# Patient Record
Sex: FEMALE | Race: WHITE | HISPANIC OR LATINO | Employment: FULL TIME | URBAN - METROPOLITAN AREA
[De-identification: names, ages, dates, MRNs, and addresses within clinical notes are randomized per-mention and may not be internally consistent; named-entity substitution may affect disease eponyms.]

---

## 2017-01-24 ENCOUNTER — ALLSCRIPTS OFFICE VISIT (OUTPATIENT)
Dept: OTHER | Facility: OTHER | Age: 52
End: 2017-01-24

## 2017-01-24 ENCOUNTER — APPOINTMENT (OUTPATIENT)
Dept: LAB | Facility: CLINIC | Age: 52
End: 2017-01-24
Payer: COMMERCIAL

## 2017-01-24 ENCOUNTER — TRANSCRIBE ORDERS (OUTPATIENT)
Dept: LAB | Facility: CLINIC | Age: 52
End: 2017-01-24

## 2017-01-24 ENCOUNTER — GENERIC CONVERSION - ENCOUNTER (OUTPATIENT)
Dept: OTHER | Facility: OTHER | Age: 52
End: 2017-01-24

## 2017-01-24 DIAGNOSIS — R92.8 OTHER ABNORMAL AND INCONCLUSIVE FINDINGS ON DIAGNOSTIC IMAGING OF BREAST: ICD-10-CM

## 2017-01-24 DIAGNOSIS — R22.2 LOCALIZED SWELLING, MASS AND LUMP, TRUNK: ICD-10-CM

## 2017-01-24 DIAGNOSIS — D17.9 BENIGN LIPOMATOUS NEOPLASM: ICD-10-CM

## 2017-01-24 DIAGNOSIS — G47.33 OBSTRUCTIVE SLEEP APNEA (ADULT) (PEDIATRIC): ICD-10-CM

## 2017-01-24 DIAGNOSIS — Z12.31 ENCOUNTER FOR SCREENING MAMMOGRAM FOR MALIGNANT NEOPLASM OF BREAST: ICD-10-CM

## 2017-01-24 DIAGNOSIS — J30.9 ALLERGIC RHINITIS, CAUSE UNSPECIFIED: Primary | ICD-10-CM

## 2017-01-24 LAB
BASOPHILS # BLD AUTO: 0 %
BASOPHILS # BLD AUTO: 0 X10E3/UL (ref 0–0.2)
DEPRECATED RDW RBC AUTO: 14.2 % (ref 12.3–15.4)
EOSINOPHIL # BLD AUTO: 0.1 X10E3/UL (ref 0–0.4)
EOSINOPHIL # BLD AUTO: 2 %
HCT VFR BLD AUTO: 36.3 % (ref 34–46.6)
HGB BLD-MCNC: 12.4 G/DL (ref 11.1–15.9)
IMM.GRANULOCYTES (CD4/8) (HISTORICAL): 0 %
IMM.GRANULOCYTES (CD4/8) (HISTORICAL): 0 X10E3/UL (ref 0–0.1)
LYMPHOCYTES # BLD AUTO: 2.6 X10E3/UL (ref 0.7–3.1)
LYMPHOCYTES # BLD AUTO: 35 %
MCH RBC QN AUTO: 29.9 PG (ref 26.6–33)
MCHC RBC AUTO-ENTMCNC: 34.2 G/DL (ref 31.5–35.7)
MCV RBC AUTO: 88 FL (ref 79–97)
MONOCYTES # BLD AUTO: 0.5 X10E3/UL (ref 0.1–0.9)
MONOCYTES (HISTORICAL): 6 %
NEUTROPHILS # BLD AUTO: 4.1 X10E3/UL (ref 1.4–7)
NEUTROPHILS # BLD AUTO: 57 %
PLATELET # BLD AUTO: 298 X10E3/UL (ref 150–379)
RBC (HISTORICAL): 4.15 X10E6/UL (ref 3.77–5.28)
VENIPUNCTURE: NORMAL
WBC # BLD AUTO: 7.3 X10E3/UL (ref 3.4–10.8)

## 2017-01-24 PROCEDURE — 36415 COLL VENOUS BLD VENIPUNCTURE: CPT | Performed by: FAMILY MEDICINE

## 2017-01-25 LAB
A/G RATIO (HISTORICAL): 1.4 (ref 1.1–2.5)
ALBUMIN SERPL BCP-MCNC: 4.1 G/DL (ref 3.5–5.5)
ALP SERPL-CCNC: 92 IU/L (ref 39–117)
ALT SERPL W P-5'-P-CCNC: 12 IU/L (ref 0–32)
AST SERPL W P-5'-P-CCNC: 15 IU/L (ref 0–40)
BILIRUB SERPL-MCNC: 0.3 MG/DL (ref 0–1.2)
BILIRUB UR QL STRIP: NEGATIVE
BUN SERPL-MCNC: 16 MG/DL (ref 6–24)
BUN/CREA RATIO (HISTORICAL): 22 (ref 9–23)
CALCIUM SERPL-MCNC: 9.3 MG/DL (ref 8.7–10.2)
CHLORIDE SERPL-SCNC: 104 MMOL/L (ref 96–106)
CHOLEST SERPL-MCNC: 251 MG/DL (ref 100–199)
CO2 SERPL-SCNC: 27 MMOL/L (ref 18–29)
COLOR UR: YELLOW
COMMENT (HISTORICAL): CLEAR
CREAT SERPL-MCNC: 0.74 MG/DL (ref 0.57–1)
EGFR AFRICAN AMERICAN (HISTORICAL): 109 ML/MIN/1.73
EGFR-AMERICAN CALC (HISTORICAL): 95 ML/MIN/1.73
FECAL OCCULT BLOOD DIAGNOSTIC (HISTORICAL): NEGATIVE
GLUCOSE (HISTORICAL): NEGATIVE
GLUCOSE SERPL-MCNC: 88 MG/DL (ref 65–99)
HDLC SERPL-MCNC: 92 MG/DL
INTERPRETATION (HISTORICAL): NORMAL
KETONES UR STRIP-MCNC: NEGATIVE MG/DL
LDLC SERPL CALC-MCNC: 148 MG/DL (ref 0–99)
LEUKOCYTE ESTERASE UR QL STRIP: NEGATIVE
MICROSCOPIC EXAMINATION (HISTORICAL): NORMAL
NITRITE UR QL STRIP: NEGATIVE
PH UR STRIP.AUTO: 5.5 [PH] (ref 5–7.5)
POTASSIUM SERPL-SCNC: 4.8 MMOL/L (ref 3.5–5.2)
PROT UR STRIP-MCNC: NEGATIVE MG/DL
SODIUM SERPL-SCNC: 144 MMOL/L (ref 134–144)
SP GR UR STRIP.AUTO: 1.02 (ref 1–1.03)
TOT. GLOBULIN, SERUM (HISTORICAL): 2.9 G/DL (ref 1.5–4.5)
TOTAL PROTEIN (HISTORICAL): 7 G/DL (ref 6–8.5)
TRIGL SERPL-MCNC: 56 MG/DL (ref 0–149)
TSH SERPL DL<=0.05 MIU/L-ACNC: 1.56 UIU/ML (ref 0.45–4.5)
UROBILINOGEN UR QL STRIP.AUTO: 0.2 EU/DL (ref 0.2–1)
VLDLC SERPL CALC-MCNC: 11 MG/DL (ref 5–40)

## 2017-01-26 ENCOUNTER — GENERIC CONVERSION - ENCOUNTER (OUTPATIENT)
Dept: OTHER | Facility: OTHER | Age: 52
End: 2017-01-26

## 2017-01-31 ENCOUNTER — HOSPITAL ENCOUNTER (OUTPATIENT)
Dept: RADIOLOGY | Facility: CLINIC | Age: 52
Discharge: HOME | End: 2017-01-31
Payer: COMMERCIAL

## 2017-01-31 DIAGNOSIS — D17.9 BENIGN LIPOMATOUS NEOPLASM: ICD-10-CM

## 2017-01-31 PROCEDURE — 76705 ECHO EXAM OF ABDOMEN: CPT

## 2017-02-01 ENCOUNTER — GENERIC CONVERSION - ENCOUNTER (OUTPATIENT)
Dept: OTHER | Facility: OTHER | Age: 52
End: 2017-02-01

## 2017-02-07 ENCOUNTER — ALLSCRIPTS OFFICE VISIT (OUTPATIENT)
Dept: OTHER | Facility: OTHER | Age: 52
End: 2017-02-07

## 2017-02-07 ENCOUNTER — GENERIC CONVERSION - ENCOUNTER (OUTPATIENT)
Dept: OTHER | Facility: OTHER | Age: 52
End: 2017-02-07

## 2017-02-07 ENCOUNTER — HOSPITAL ENCOUNTER (OUTPATIENT)
Dept: RADIOLOGY | Facility: HOSPITAL | Age: 52
Discharge: HOME | End: 2017-02-07
Attending: FAMILY MEDICINE
Payer: COMMERCIAL

## 2017-02-07 DIAGNOSIS — Z12.31 ENCOUNTER FOR SCREENING MAMMOGRAM FOR MALIGNANT NEOPLASM OF BREAST: ICD-10-CM

## 2017-02-07 PROCEDURE — G0202 SCR MAMMO BI INCL CAD: HCPCS

## 2017-02-17 ENCOUNTER — HOSPITAL ENCOUNTER (OUTPATIENT)
Dept: RADIOLOGY | Facility: HOSPITAL | Age: 52
Discharge: HOME | End: 2017-02-17
Attending: FAMILY MEDICINE
Payer: COMMERCIAL

## 2017-02-17 DIAGNOSIS — R92.8 OTHER ABNORMAL AND INCONCLUSIVE FINDINGS ON DIAGNOSTIC IMAGING OF BREAST: ICD-10-CM

## 2017-02-17 PROCEDURE — 76642 ULTRASOUND BREAST LIMITED: CPT

## 2017-02-20 ENCOUNTER — GENERIC CONVERSION - ENCOUNTER (OUTPATIENT)
Dept: OTHER | Facility: OTHER | Age: 52
End: 2017-02-20

## 2017-02-20 ENCOUNTER — ALLSCRIPTS OFFICE VISIT (OUTPATIENT)
Dept: OTHER | Facility: OTHER | Age: 52
End: 2017-02-20

## 2017-03-02 ENCOUNTER — ALLSCRIPTS OFFICE VISIT (OUTPATIENT)
Dept: OTHER | Facility: OTHER | Age: 52
End: 2017-03-02

## 2017-03-16 RX ORDER — FLUTICASONE PROPIONATE 50 MCG
1 SPRAY, SUSPENSION (ML) NASAL AS NEEDED
COMMUNITY
End: 2018-03-29

## 2017-03-19 ENCOUNTER — ANESTHESIA EVENT (OUTPATIENT)
Dept: GASTROENTEROLOGY | Facility: AMBULARY SURGERY CENTER | Age: 52
End: 2017-03-19
Payer: COMMERCIAL

## 2017-03-20 ENCOUNTER — GENERIC CONVERSION - ENCOUNTER (OUTPATIENT)
Dept: OTHER | Facility: OTHER | Age: 52
End: 2017-03-20

## 2017-03-20 ENCOUNTER — HOSPITAL ENCOUNTER (OUTPATIENT)
Facility: AMBULARY SURGERY CENTER | Age: 52
Setting detail: OUTPATIENT SURGERY
Discharge: HOME | End: 2017-03-20
Attending: INTERNAL MEDICINE | Admitting: INTERNAL MEDICINE
Payer: COMMERCIAL

## 2017-03-20 ENCOUNTER — ANESTHESIA (OUTPATIENT)
Dept: GASTROENTEROLOGY | Facility: AMBULARY SURGERY CENTER | Age: 52
End: 2017-03-20
Payer: COMMERCIAL

## 2017-03-20 VITALS
WEIGHT: 167 LBS | SYSTOLIC BLOOD PRESSURE: 112 MMHG | HEART RATE: 57 BPM | DIASTOLIC BLOOD PRESSURE: 67 MMHG | HEIGHT: 66 IN | OXYGEN SATURATION: 99 % | TEMPERATURE: 97.6 F | RESPIRATION RATE: 20 BRPM | BODY MASS INDEX: 26.84 KG/M2

## 2017-03-20 RX ORDER — SODIUM CHLORIDE, SODIUM LACTATE, POTASSIUM CHLORIDE, CALCIUM CHLORIDE 600; 310; 30; 20 MG/100ML; MG/100ML; MG/100ML; MG/100ML
125 INJECTION, SOLUTION INTRAVENOUS CONTINUOUS
Status: DISCONTINUED | OUTPATIENT
Start: 2017-03-20 | End: 2017-03-20 | Stop reason: HOSPADM

## 2017-03-20 RX ORDER — PROPOFOL 10 MG/ML
INJECTION, EMULSION INTRAVENOUS AS NEEDED
Status: DISCONTINUED | OUTPATIENT
Start: 2017-03-20 | End: 2017-03-20 | Stop reason: SURG

## 2017-03-20 RX ADMIN — PROPOFOL 20 MG: 10 INJECTION, EMULSION INTRAVENOUS at 11:46

## 2017-03-20 RX ADMIN — PROPOFOL 20 MG: 10 INJECTION, EMULSION INTRAVENOUS at 11:44

## 2017-03-20 RX ADMIN — PROPOFOL 10 MG: 10 INJECTION, EMULSION INTRAVENOUS at 11:51

## 2017-03-20 RX ADMIN — PROPOFOL 30 MG: 10 INJECTION, EMULSION INTRAVENOUS at 11:43

## 2017-03-20 RX ADMIN — LIDOCAINE HYDROCHLORIDE 50 MG: 20 INJECTION, SOLUTION INTRAVENOUS at 11:42

## 2017-03-20 RX ADMIN — PROPOFOL 100 MG: 10 INJECTION, EMULSION INTRAVENOUS at 11:42

## 2017-03-20 RX ADMIN — PROPOFOL 20 MG: 10 INJECTION, EMULSION INTRAVENOUS at 11:49

## 2017-03-20 RX ADMIN — SODIUM CHLORIDE, SODIUM LACTATE, POTASSIUM CHLORIDE, AND CALCIUM CHLORIDE: .6; .31; .03; .02 INJECTION, SOLUTION INTRAVENOUS at 11:37

## 2017-05-02 DIAGNOSIS — R92.8 OTHER ABNORMAL AND INCONCLUSIVE FINDINGS ON DIAGNOSTIC IMAGING OF BREAST: ICD-10-CM

## 2018-01-09 NOTE — RESULT NOTES
Message   please call patient  u/s look benign and most likely scarring from prior surgery  will repeat u/s in 3 months   Verified Results  *US BREAST LEFT LIMITED (DIAGNOSTIC) [id number] 02:05PM My Dempsey Order Number: [id number]    - Patient Instructions: To schedule this appointment, please contact Central Scheduling at [phone]  Test Name Result Flag Reference   US BREAST LEFT LIMITED (Report)     Patient History:   Family history of breast cancer at age 61 in sister  Reductions of both breasts, January 1, 2016  Patient's BMI is 27 4  Further assessment of bilateral mammographic findings on most    recent screening study dated 2/7/2017  Of note, this most recent   screening examination was the 1st postoperative exam after    reduction mammoplasty which was reportedly performed April 2016  US Breast Right Limited: February 17, 2017 - Check In #: [de-identified]   Standard views  Technologist: HELEN Barber RDMS RVT RDCS   Ultrasound of the right breast at the 5 o'clock position 4 cm    from nipple demonstrates a small oval-shaped very minimally    lobulated hypoechoic island of tissue which is 9 x 7 x 4 mm and    appears to be a good match for the mammographic lesion  Doppler    does not demonstrate any internal blood flow  There is a small    blood vessel near the margin  This does not produce shadowing    deep to the lesion and the margins are smooth  Another much more   superficial similar finding is seen at the 5 o'clock position 6    cm from nipple measuring 5 x 4 x 2 mm  In the left breast there were no findings inferiorly  At the 9    o'clock position there is an [place] of slightly heterogeneous but   hypoechoic tissue 9:00 location 4 cm from nipple which is 11 x    13 x 10 mm  This corresponds to one of the mammographic    findings  There is some shadowing deep to this area  It has    more echogenic appearance more superficially   Doppler does not demonstrate internal or peripheral flow  At the 11:00 location 1 cm from nipple there is a similar    appearing island of hypoechoic tissue which is 10 x 11 x 14 mm  It is somewhat ill-defined and produces some shadowing but is    nonvascular  I suspect that the findings in both breasts are probably    postoperative scarring given the prior reduction mammoplasty  However, as the ultrasound findings are nonspecific, I would    suggest a 3 month follow-up bilateral ultrasound examination of    these findings to ensure stability or decrease in size  If there   are any signs of growth or other concerning change at the next    ultrasound follow-up, one or more of these findings might require   ultrasound-guided core biopsy for definitive evaluation  I    explained this to the patient  Indeterminate hypoechoic islands of tissue bilaterally   which I suspect probably represents scarring given the history    of reduction mammoplasty since the previous screening exam     Short interval follow-up recommended  US Breast Left Limited: February 17, 2017 - Check In #: [de-identified]   Standard views  Technologist: HELEN Mills RDMS RVT RDCS     ACR BI-RADSï¾® Assessments: BiRad:3 - Probably Benign (Overall)   Right breast Right Brst US: BiRad:3 - probably benign finding in    the right breast    Left breast Left Brst US: BiRad:3 - probably benign finding in    the left breast      Recommendation:   Ultrasound of both breasts in 3 months  I explained the findings and recommendations to the patient in    person  I also encouraged monthly self breast examination to    assess for any developing or enlarging masses  The patient    states that she currently does not feel any lumps       Transcription Location:  M[facility] 98: [id number]     Risk Value(s):   Tyrer-Cuzick 10 Year: 4 100%, Tyrer-Cuzick Lifetime: 17 000%,    Myriad Table: 1 5%, LAURENCE 5 Year: 1 9%, NCI Lifetime: 16 4%   Signed by: Kathya Diamond MD   2/17/17

## 2018-01-11 NOTE — RESULT NOTES
Verified Results  (1) CBC/PLT/DIFF [id number] 12:00AM [facility]     Test Name Result Flag Reference   WBC 7 3 x10E3/uL  3 4-10 8   RBC 4 15 x10E6/uL  3 77-5 28   Hemoglobin 12 4 g/dL  11 1-15 9   Hematocrit 36 3 %  34 0-46  6   MCV 88 fL  79-97   MCH 29 9 pg  26 6-33 0   MCHC 34 2 g/dL  31 5-35 7   RDW 14 2 %  12 3-15 4   Platelets 469 R35E5/LV  150-379   Neutrophils 57 %     Lymphs 35 %     Monocytes 6 %     Eos 2 %     Basos 0 %     Neutrophils (Absolute) 4 1 x10E3/uL  1 4-7 0   Lymphs (Absolute) 2 6 x10E3/uL  0 7-3 1   Monocytes(Absolute) 0 5 x10E3/uL  0 1-0 9   Eos (Absolute) 0 1 x10E3/uL  0 0-0 4   Baso (Absolute) 0 0 x10E3/uL  0 0-0 2   Immature Granulocytes 0 %     Immature Grans (Abs) 0 0 x10E3/uL  0 0-0 1     (1) COMPREHENSIVE METABOLIC PANEL [id number] 52:04GU [facility]     Test Name Result Flag Reference   Glucose, Serum 88 mg/dL  65-99   BUN 16 mg/dL  6-24   Creatinine, Serum 0 74 mg/dL  0 57-1 00   eGFR If NonAfricn Am 95 mL/min/1 73  >59   eGFR If Africn Am 109 mL/min/1 73  >59   BUN/Creatinine Ratio 22  9-23   Sodium, Serum 144 mmol/L  134-144   Potassium, Serum 4 8 mmol/L  3 5-5 2   Chloride, Serum 104 mmol/L     Carbon Dioxide, Total 27 mmol/L  18-29   Calcium, Serum 9 3 mg/dL  8 7-10 2   Protein, Total, Serum 7 0 g/dL  6 0-8 5   Albumin, Serum 4 1 g/dL  3 5-5 5   Globulin, Total 2 9 g/dL  1 5-4 5   A/G Ratio 1 4  1 1-2 5   Bilirubin, Total 0 3 mg/dL  0 0-1 2   Alkaline Phosphatase, S 92 IU/L     AST (SGOT) 15 IU/L  0-40   ALT (SGPT) 12 IU/L  0-32     (1) TSH [id number] 12:00AM [facility]     Test Name Result Flag Reference   TSH 1 560 uIU/mL  0 450-4 500     (1) URINALYSIS (will reflex a microscopy if leukocytes, occult blood, protein or nitrites are not within normal limits) [id number] 12:00AM [facility]     Test Name Result Flag Reference   Specific Gravity 1 019  1 005-1 030   pH 5 5  5 0-7 5   Urine-Color Yellow  Yellow   Appearance Clear  Clear   WBC Esterase Negative  Negative   Protein Negative  Negative/Trace   Glucose Negative  Negative   Ketones Negative  Negative   Occult Blood Negative  Negative   Bilirubin Negative  Negative   Urobilinogen,Semi-Qn 0 2 EU/dL  0 2-1 0   Nitrite, Urine Negative  Negative   Microscopic Examination Comment     Microscopic follows if indicated  [facility]) LP [id number] 12:00AM [facility]     Test Name Result Flag Reference   Cholesterol, Total 251 mg/dL H 100-199   Triglycerides 56 mg/dL  0-149   HDL Cholesterol 92 mg/dL  >39   VLDL Cholesterol Tommie 11 mg/dL  5-40   LDL Cholesterol Calc 148 mg/dL H 0-99     [facility]) Cardiovascular Risk Assessment [id number] 12:00AM [employer]     Test Name Result Flag Reference   Interpretation Note     Supplement report is available  PDF Image   Discussion/Summary   Cholesterol is elevated  Please make sure to eat a heart healthy diet with low white carbs and low refined sugar  Your labs are stable  Please follow up as we discussed at your last appointment    Dr Tresa Cueto

## 2018-01-12 VITALS
TEMPERATURE: 97.6 F | HEART RATE: 70 BPM | SYSTOLIC BLOOD PRESSURE: 116 MMHG | WEIGHT: 171 LBS | BODY MASS INDEX: 27.48 KG/M2 | DIASTOLIC BLOOD PRESSURE: 70 MMHG | HEIGHT: 66 IN | RESPIRATION RATE: 16 BRPM

## 2018-01-13 VITALS
HEART RATE: 61 BPM | SYSTOLIC BLOOD PRESSURE: 120 MMHG | WEIGHT: 171.13 LBS | OXYGEN SATURATION: 98 % | TEMPERATURE: 97.6 F | BODY MASS INDEX: 27.5 KG/M2 | HEIGHT: 66 IN | RESPIRATION RATE: 16 BRPM | DIASTOLIC BLOOD PRESSURE: 76 MMHG

## 2018-01-13 VITALS
HEART RATE: 62 BPM | DIASTOLIC BLOOD PRESSURE: 58 MMHG | HEIGHT: 66 IN | SYSTOLIC BLOOD PRESSURE: 110 MMHG | OXYGEN SATURATION: 99 % | WEIGHT: 169.13 LBS | TEMPERATURE: 98 F | BODY MASS INDEX: 27.18 KG/M2

## 2018-01-13 VITALS
RESPIRATION RATE: 16 BRPM | DIASTOLIC BLOOD PRESSURE: 66 MMHG | BODY MASS INDEX: 27.32 KG/M2 | TEMPERATURE: 97.9 F | SYSTOLIC BLOOD PRESSURE: 110 MMHG | HEIGHT: 66 IN | HEART RATE: 60 BPM | WEIGHT: 170 LBS

## 2018-01-13 NOTE — RESULT NOTES
Message   please call patient  + cyst or abscess and radiology recommends a biopsy  neither are concerning  please give the number for Dr Russ Ramirez for bx   rl      Verified Results  Baron 38 (NON EXTREMITY) [id number] 08:46AM I[facility] To Order Number: [id number]    - Patient Instructions: To schedule this appointment, please contact Central Scheduling at [phone]  Test Name Result Flag Reference   US TRUNK SOFT TISSUE (Report)     ULTRASOUND posterior right flank     TECHNIQUE:  Using a high frequency transducer, the right posterior flank was scanned to evaluate a palpable lump  INDICATION: Palpable lump  COMPARISON: None     FINDINGS:     Within the musculature of the posterior right flank is a fairly well-circumscribed hypoechoic mass which measures 1 4 cm in diameter  This has internal echoes with no enhanced through transmission  This appears avascular  IMPRESSION:     Hypoechoic mass in the posterior right flank as described  This appears avascular  This could represent a sebaceous cyst or abscess  Biopsy suggested       ##sigslh##sigslh       Workstation performed: [id number]     Signed by:   Logan Mcmanus MD   1/31/17

## 2018-03-13 ENCOUNTER — TRANSCRIBE ORDERS (OUTPATIENT)
Dept: URGENT CARE | Facility: CLINIC | Age: 53
End: 2018-03-13

## 2018-03-13 ENCOUNTER — APPOINTMENT (OUTPATIENT)
Dept: RADIOLOGY | Facility: CLINIC | Age: 53
End: 2018-03-13

## 2018-03-13 DIAGNOSIS — Z02.1 PRE-EMPLOYMENT EXAMINATION: ICD-10-CM

## 2018-03-13 DIAGNOSIS — Z02.1 PRE-EMPLOYMENT EXAMINATION: Primary | ICD-10-CM

## 2018-03-13 PROCEDURE — 71045 X-RAY EXAM CHEST 1 VIEW: CPT

## 2018-03-29 ENCOUNTER — OFFICE VISIT (OUTPATIENT)
Dept: FAMILY MEDICINE CLINIC | Facility: CLINIC | Age: 53
End: 2018-03-29
Payer: COMMERCIAL

## 2018-03-29 VITALS
SYSTOLIC BLOOD PRESSURE: 122 MMHG | DIASTOLIC BLOOD PRESSURE: 82 MMHG | WEIGHT: 166.4 LBS | HEIGHT: 66 IN | BODY MASS INDEX: 26.74 KG/M2 | TEMPERATURE: 97.4 F | RESPIRATION RATE: 16 BRPM | HEART RATE: 62 BPM

## 2018-03-29 DIAGNOSIS — R53.83 FATIGUE, UNSPECIFIED TYPE: ICD-10-CM

## 2018-03-29 DIAGNOSIS — R92.8 ABNORMAL MAMMOGRAM: ICD-10-CM

## 2018-03-29 DIAGNOSIS — Z98.890 S/P GASTRIC SURGERY: ICD-10-CM

## 2018-03-29 DIAGNOSIS — Z00.00 PHYSICAL EXAM, ANNUAL: Primary | ICD-10-CM

## 2018-03-29 DIAGNOSIS — E78.2 HYPERLIPEMIA, MIXED: ICD-10-CM

## 2018-03-29 PROBLEM — G47.33 OSA (OBSTRUCTIVE SLEEP APNEA): Status: ACTIVE | Noted: 2017-01-24

## 2018-03-29 PROBLEM — K59.00 CONSTIPATION: Status: ACTIVE | Noted: 2017-02-07

## 2018-03-29 PROBLEM — D17.9 LIPOMA: Status: ACTIVE | Noted: 2017-01-24

## 2018-03-29 PROCEDURE — 99396 PREV VISIT EST AGE 40-64: CPT | Performed by: FAMILY MEDICINE

## 2018-03-29 NOTE — PATIENT INSTRUCTIONS
Reviewed with patient the appropriate health maintenance testing  needed  Discussed the importance of diet and exercise and the role it plays in prevention of disease  Discussed with patient the importance of emotional health as well as physical health  Immunizations reviewed and discussed          Get bw done  Get mammogram and u/s done  Pap due next year     F/u in 1 m

## 2018-03-29 NOTE — PROGRESS NOTES
FAMILY PRACTICE HEALTH MAINTENANCE OFFICE VISIT  [facility]    NAME: Sharyn Vizcaino  AGE: 47 y o  SEX: female  : 1965     DATE: 3/29/2018    Assessment and Plan     Problem List Items Addressed This Visit     Abnormal mammogram     Overdue for u/s of b/l breasts  Will give diag and u/s orders         Relevant Orders    Mammo diagnostic bilateral w cad    US breast left limited (diagnostic)    US breast right limited (diagnostic)    Hyperlipemia, mixed     Will repeat bw          Relevant Orders    Lipid panel      Other Visit Diagnoses     Physical exam, annual    -  Primary    hm discussed    BMI 26 0-26 9,adult        Fatigue, unspecified type        hx of gastric sleeve  will get bw including vit levels and iron     Relevant Orders    CBC and differential    Comprehensive metabolic panel    TSH, 3rd generation with T4 reflex    Urinalysis with microscopic    S/P gastric surgery        Relevant Orders    Vitamin B12    Vitamin D 25 hydroxy    Iron    TIBC            · Patient Counseling:   · Nutrition: Stressed importance of a well balanced diet, moderation of sodium/saturated fat, caloric balance and sufficient intake of fiber  · Exercise: Stressed the importance of regular exercise with a goal of 150 minutes per week    · Immunizations reviewed  Refused tdap  · Discussed benefits of screening   · Discussed the patient's BMI with her  The BMI is above average; BMI management plan is completed     Return in about 4 weeks (around 2018)  Chief Complaint     Chief Complaint   Patient presents with    Physical Exam       History of Present Illness     Pt seen and examined  Here for physical  Pt was supposed to repeat u/s of b/l breasts 3 months from 2017-didn't get it done  Declined tdap  Reviewed bw from 2017 and cholesterol was elevated    Pt hasn't had pap since 2016    Had breast reduction and abdominoplasy in         Pt stated that she doesn't feel well  Feels dizzy and occasional headache  Pt's  stated that she doesn't snore    Had PEDRITO but improved after gastric sleeve    Does have hx of anemia  C/o feeling cold         Well Adult Physical   Patient here for a comprehensive physical exam       Diet and Physical Activity  Diet: doesn't eat a lot of meat and had gastric sleeve  Weight concerns: feels gaining weight  Exercise: not currently due to weather       Depression Screen  PHQ-9 Depression Screening    PHQ-9:    Frequency of the following problems over the past two weeks:       Little interest or pleasure in doing things:  0 - not at all  Feeling down, depressed, or hopeless:  0 - not at all  PHQ-2 Score:  0          General Health  Hearing: no issues   Vision: wears glasses  Dental: regular dental visits    Reproductive Health        The following portions of the patient's history were reviewed and updated as appropriate: allergies, current medications, past family history, past medical history, past social history, past surgical history and problem list     Review of Systems     Review of Systems   Constitutional: Positive for fatigue  Negative for activity change and appetite change  Weight gain   HENT: Negative for hearing loss  Eyes: Negative for visual disturbance  Respiratory: Negative for cough, shortness of breath and wheezing  Cardiovascular: Negative for chest pain, palpitations and leg swelling  Gastrointestinal: Positive for constipation  Negative for diarrhea  Genitourinary: Negative for difficulty urinating  Musculoskeletal: Negative for arthralgias, back pain, gait problem, joint swelling and myalgias  Skin: Negative for rash  Neurological: Positive for dizziness, numbness and headaches  Negative for tremors, syncope, speech difficulty, weakness and light-headedness  Hematological: Negative for adenopathy  Does not bruise/bleed easily  Psychiatric/Behavioral: Negative for dysphoric mood  Past Medical History     Past Medical History:   Diagnosis Date    History of transfusion     post surgery ,     Hyperlipidemia     Sleep apnea     Hx of prior to gastric sleeve surgery       Past Surgical History     Past Surgical History:   Procedure Laterality Date    BREAST SURGERY Bilateral     reduction     SECTION      x2    CHOLECYSTECTOMY      lap    COLONOSCOPY N/A 3/20/2017    Procedure: COLONOSCOPY;  Surgeon: Linda Bassett MD;  Location: [facility]; Service:     COSMETIC SURGERY      tummy tuck    HYSTERECTOMY      partial    STOMACH SURGERY      sleeve       Social History     Social History     Social History    Marital status: Single     Spouse name: N/A    Number of children: N/A    Years of education: N/A     Social History Main Topics    Smoking status: Never Smoker    Smokeless tobacco: Never Used    Alcohol use Yes      Comment: socially    Drug use: No    Sexual activity: Not Asked     Other Topics Concern    None     Social History Narrative    None       Family History     Family History   Problem Relation Age of Onset    Diabetes Mother     Heart disease Father     Cancer Sister      breast    Heart disease Brother        Current Medications       Current Outpatient Prescriptions:     Linaclotide (LINZESS) 290 MCG CAPS, Take 1 capsule by mouth daily in the early morning for 30 days, Disp: 30 capsule, Rfl: 0     Allergies     No Known Allergies    Objective     /82 (BP Location: Left arm, Patient Position: Sitting, Cuff Size: Standard)   Pulse 62   Temp (!) 97 4 °F (36 3 °C)   Resp 16   Ht 5' 6" (1 676 m)   Wt 75 5 kg (166 lb 6 4 oz)   BMI 26 86 kg/m²      Physical Exam   Constitutional: She is oriented to person, place, and time  She appears well-developed and well-nourished  No distress  HENT:   Head: Normocephalic and atraumatic     Right Ear: External ear normal    Left Ear: External ear normal    Nose: Nose normal  Mouth/Throat: Oropharynx is clear and moist    Eyes: Conjunctivae and EOM are normal  Pupils are equal, round, and reactive to light  No scleral icterus  Neck: Normal range of motion  Neck supple  No JVD present  No thyromegaly present  Cardiovascular: Normal rate, regular rhythm, normal heart sounds and intact distal pulses  Exam reveals no gallop and no friction rub  No murmur heard  Pulmonary/Chest: Effort normal and breath sounds normal  No respiratory distress  She has no wheezes  She has no rales  Abdominal: Soft  Bowel sounds are normal  There is no tenderness  There is no rebound and no guarding  Musculoskeletal: Normal range of motion  She exhibits no edema  Lymphadenopathy:     She has no cervical adenopathy  Neurological: She is alert and oriented to person, place, and time  She has normal reflexes  No cranial nerve deficit  Skin: Skin is warm and dry  Psychiatric: She has a normal mood and affect  Judgment and thought content normal    Vitals reviewed  Visual Acuity Screening    Right eye Left eye Both eyes   Without correction:      With correction: 20/20/ 20/20 20/13       Health Maintenance     Health Maintenance   Topic Date Due    HIV SCREENING  1965    Depression Screening PHQ-9  09/19/1978    DTaP,Tdap,and Td Vaccines (1 - Tdap) 09/19/1987    INFLUENZA VACCINE  09/01/2017    COLONOSCOPY  03/20/2027       There is no immunization history on file for this patient      Khoi Teague DO  [address]

## 2018-04-10 LAB
25(OH)D3+25(OH)D2 SERPL-MCNC: 35.5 NG/ML (ref 30–100)
ALBUMIN SERPL-MCNC: 4.3 G/DL (ref 3.5–5.5)
ALBUMIN/GLOB SERPL: 1.5 {RATIO} (ref 1.2–2.2)
ALP SERPL-CCNC: 93 IU/L (ref 39–117)
ALT SERPL-CCNC: 14 IU/L (ref 0–32)
APPEARANCE UR: CLEAR
AST SERPL-CCNC: 19 IU/L (ref 0–40)
BACTERIA URNS QL MICRO: NORMAL
BASOPHILS # BLD AUTO: 0 X10E3/UL (ref 0–0.2)
BASOPHILS NFR BLD AUTO: 1 %
BILIRUB SERPL-MCNC: 0.3 MG/DL (ref 0–1.2)
BILIRUB UR QL STRIP: NEGATIVE
BUN SERPL-MCNC: 12 MG/DL (ref 6–24)
BUN/CREAT SERPL: 15 (ref 9–23)
CALCIUM SERPL-MCNC: 9 MG/DL (ref 8.7–10.2)
CHLORIDE SERPL-SCNC: 102 MMOL/L (ref 96–106)
CHOLEST SERPL-MCNC: 235 MG/DL (ref 100–199)
CHOLEST/HDLC SERPL: 2.8 RATIO (ref 0–4.4)
CO2 SERPL-SCNC: 29 MMOL/L (ref 18–29)
COLOR UR: YELLOW
CREAT SERPL-MCNC: 0.8 MG/DL (ref 0.57–1)
EOSINOPHIL # BLD AUTO: 0.1 X10E3/UL (ref 0–0.4)
EOSINOPHIL NFR BLD AUTO: 1 %
EPI CELLS #/AREA URNS HPF: NORMAL /HPF
ERYTHROCYTE [DISTWIDTH] IN BLOOD BY AUTOMATED COUNT: 14.7 % (ref 12.3–15.4)
GLOBULIN SER-MCNC: 2.9 G/DL (ref 1.5–4.5)
GLUCOSE SERPL-MCNC: 85 MG/DL (ref 65–99)
GLUCOSE UR QL: NEGATIVE
HCT VFR BLD AUTO: 36.4 % (ref 34–46.6)
HDLC SERPL-MCNC: 84 MG/DL
HGB BLD-MCNC: 12.3 G/DL (ref 11.1–15.9)
HGB UR QL STRIP: NEGATIVE
IMM GRANULOCYTES # BLD: 0 X10E3/UL (ref 0–0.1)
IMM GRANULOCYTES NFR BLD: 0 %
IRON SATN MFR SERPL: 68 % (ref 15–55)
IRON SERPL-MCNC: 243 UG/DL (ref 27–159)
KETONES UR QL STRIP: NEGATIVE
LDLC SERPL CALC-MCNC: 140 MG/DL (ref 0–99)
LEUKOCYTE ESTERASE UR QL STRIP: NEGATIVE
LYMPHOCYTES # BLD AUTO: 2.2 X10E3/UL (ref 0.7–3.1)
LYMPHOCYTES NFR BLD AUTO: 38 %
MCH RBC QN AUTO: 30.1 PG (ref 26.6–33)
MCHC RBC AUTO-ENTMCNC: 33.8 G/DL (ref 31.5–35.7)
MCV RBC AUTO: 89 FL (ref 79–97)
MICRO URNS: NORMAL
MICRO URNS: NORMAL
MICRODELETION SYND BLD/T FISH: NORMAL
MONOCYTES # BLD AUTO: 0.3 X10E3/UL (ref 0.1–0.9)
MONOCYTES NFR BLD AUTO: 5 %
MUCOUS THREADS URNS QL MICRO: PRESENT
NEUTROPHILS # BLD AUTO: 3.2 X10E3/UL (ref 1.4–7)
NEUTROPHILS NFR BLD AUTO: 55 %
NITRITE UR QL STRIP: NEGATIVE
PH UR STRIP: 6.5 [PH] (ref 5–7.5)
PLATELET # BLD AUTO: 293 X10E3/UL (ref 150–379)
POTASSIUM SERPL-SCNC: 4.3 MMOL/L (ref 3.5–5.2)
PROT SERPL-MCNC: 7.2 G/DL (ref 6–8.5)
PROT UR QL STRIP: NEGATIVE
RBC # BLD AUTO: 4.09 X10E6/UL (ref 3.77–5.28)
RBC #/AREA URNS HPF: NORMAL /HPF
SL AMB EGFR AFRICAN AMERICAN: 99 ML/MIN/1.73
SL AMB EGFR NON AFRICAN AMERICAN: 86 ML/MIN/1.73
SL AMB VLDL CHOLESTEROL CALC: 11 MG/DL (ref 5–40)
SODIUM SERPL-SCNC: 144 MMOL/L (ref 134–144)
SP GR UR: 1.02 (ref 1–1.03)
TIBC SERPL-MCNC: 358 UG/DL (ref 250–450)
TRIGL SERPL-MCNC: 57 MG/DL (ref 0–149)
TSH SERPL DL<=0.005 MIU/L-ACNC: 1.89 UIU/ML (ref 0.45–4.5)
UIBC SERPL-MCNC: 115 UG/DL (ref 131–425)
UROBILINOGEN UR STRIP-ACNC: 0.2 EU/DL (ref 0.2–1)
VIT B12 SERPL-MCNC: 793 PG/ML (ref 232–1245)
WBC # BLD AUTO: 5.9 X10E3/UL (ref 3.4–10.8)
WBC #/AREA URNS HPF: NORMAL /HPF

## 2018-04-13 ENCOUNTER — TELEPHONE (OUTPATIENT)
Dept: FAMILY MEDICINE CLINIC | Facility: CLINIC | Age: 53
End: 2018-04-13

## 2018-04-13 NOTE — TELEPHONE ENCOUNTER
----- Message from [facility], DO sent at 4/13/2018  8:49 AM EDT -----  Recommend follow up to discuss cholesterol and iron levels   Nothing urgent

## 2018-04-16 ENCOUNTER — HOSPITAL ENCOUNTER (OUTPATIENT)
Dept: RADIOLOGY | Facility: HOSPITAL | Age: 53
Discharge: HOME | End: 2018-04-16
Attending: FAMILY MEDICINE
Payer: COMMERCIAL

## 2018-04-16 ENCOUNTER — TELEPHONE (OUTPATIENT)
Dept: FAMILY MEDICINE CLINIC | Facility: CLINIC | Age: 53
End: 2018-04-16

## 2018-04-16 DIAGNOSIS — R92.8 ABNORMAL MAMMOGRAM: ICD-10-CM

## 2018-04-16 PROCEDURE — 77066 DX MAMMO INCL CAD BI: CPT

## 2018-04-16 PROCEDURE — 76642 ULTRASOUND BREAST LIMITED: CPT

## 2018-04-16 NOTE — TELEPHONE ENCOUNTER
----- Message from Wayne Mo DO sent at 4/13/2018  8:49 AM EDT -----  Recommend follow up to discuss cholesterol and iron levels   Nothing urgent

## 2018-04-16 NOTE — TELEPHONE ENCOUNTER
----- Message from Eduardo Aquino DO sent at 4/16/2018  2:26 PM EDT -----  U/s reviewed -- prob benign-- repeat u/s in 6 m

## 2018-04-19 ENCOUNTER — OFFICE VISIT (OUTPATIENT)
Dept: FAMILY MEDICINE CLINIC | Facility: CLINIC | Age: 53
End: 2018-04-19
Payer: COMMERCIAL

## 2018-04-19 VITALS
BODY MASS INDEX: 26.93 KG/M2 | SYSTOLIC BLOOD PRESSURE: 118 MMHG | DIASTOLIC BLOOD PRESSURE: 78 MMHG | RESPIRATION RATE: 16 BRPM | WEIGHT: 167.6 LBS | HEIGHT: 66 IN | TEMPERATURE: 97.8 F | HEART RATE: 72 BPM

## 2018-04-19 DIAGNOSIS — E78.2 HYPERLIPEMIA, MIXED: ICD-10-CM

## 2018-04-19 DIAGNOSIS — R79.89 ABNORMAL CBC: Primary | ICD-10-CM

## 2018-04-19 DIAGNOSIS — R10.13 EPIGASTRIC PAIN: ICD-10-CM

## 2018-04-19 DIAGNOSIS — R92.8 ABNORMAL MAMMOGRAM: ICD-10-CM

## 2018-04-19 PROCEDURE — 99214 OFFICE O/P EST MOD 30 MIN: CPT | Performed by: FAMILY MEDICINE

## 2018-04-19 NOTE — PROGRESS NOTES
Chief Complaint   Patient presents with    Follow-up     lab results         Patient ID: Anum He is a 47 y o  female  Pt seen and examined  Pt had abnl mammogram but u/s of b/l breast were "probably benign"  Repeat is due in 6m    Abnormal iron studies  Pt did have gastric sleeve  Pt was taking extra iron with her MVI      Reviewed bw with patient      Has abdominal pain x 2 days that is sharp  No n/v/d  Epigastric area  Didn't eat today and pain is back   Hx of naty            The following portions of the patient's history were reviewed and updated as appropriate: allergies, current medications, past family history, past medical history, past social history, past surgical history and problem list     Review of Systems   Constitutional: Negative for activity change, appetite change and fatigue  Respiratory: Negative for cough and chest tightness  Cardiovascular: Negative for chest pain, palpitations and leg swelling  Gastrointestinal: Positive for abdominal pain and constipation  Negative for diarrhea, nausea and vomiting  Endocrine: Positive for cold intolerance  Genitourinary: Negative for difficulty urinating  Musculoskeletal: Negative for arthralgias and myalgias  Neurological: Negative for dizziness, weakness and headaches  Hematological: Negative for adenopathy  Psychiatric/Behavioral: Negative for behavioral problems  The patient is not nervous/anxious  Current Outpatient Prescriptions   Medication Sig Dispense Refill    Linaclotide (LINZESS) 290 MCG CAPS Take 1 capsule by mouth daily in the early morning for 30 days 30 capsule 0     No current facility-administered medications for this visit          Objective:    /78 (BP Location: Left arm, Patient Position: Sitting, Cuff Size: Standard)   Pulse 72   Temp 97 8 °F (36 6 °C)   Resp 16   Ht 5' 6" (1 676 m)   Wt 76 kg (167 lb 9 6 oz)   BMI 27 05 kg/m²        Physical Exam   Constitutional: She is oriented to person, place, and time  She appears well-developed and well-nourished  Eyes: Conjunctivae are normal    Neck: Normal range of motion  Cardiovascular: Normal rate, regular rhythm, normal heart sounds and intact distal pulses  Pulmonary/Chest: Effort normal and breath sounds normal    Abdominal: Soft  She exhibits no distension  There is no tenderness  There is no rebound and no guarding  Musculoskeletal: She exhibits no edema  Neurological: She is alert and oriented to person, place, and time  Psychiatric: She has a normal mood and affect  No visits with results within 2 Week(s) from this visit  Latest known visit with results is:   Office Visit on 03/29/2018   Component Date Value Ref Range Status    Specific Gravity 04/09/2018 1 017  1 005 - 1 030 Final    Ph 04/09/2018 6 5  5 0 - 7 5 Final    SL AMB COLOR, URINE 04/09/2018 Yellow  Yellow Final    Urine Appearance 04/09/2018 Clear  Clear Final    SL AMB LEUKOCYTE ESTERASE URINE 04/09/2018 Negative  Negative Final    Protein 04/09/2018 Negative  Negative/Trace Final    Glucose, Urine 04/09/2018 Negative  Negative Final    SL AMB KETONE, URINE, QUAL  04/09/2018 Negative  Negative Final    SL AMB BLOOD, URINE 04/09/2018 Negative  Negative Final    SL AMB BILIRUBIN, URINE 04/09/2018 Negative  Negative Final    Urobilinogen Urine 04/09/2018 0 2  0 2 - 1 0 EU/dL Final    SL AMB NITRITES URINE, QUAL   04/09/2018 Negative  Negative Final    Microscopic Examination 04/09/2018 Comment   Final    Microscopic Examination 04/09/2018 See below:   Final    SL AMB LAB WHITE BLOOD CELL COUNT 04/09/2018 5 9  3 4 - 10 8 x10E3/uL Final    SL AMB LAB RED BLOOD CELLS 04/09/2018 4 09  3 77 - 5 28 x10E6/uL Final    Hemoglobin 04/09/2018 12 3  11 1 - 15 9 g/dL Final    Hematocrit 04/09/2018 36 4  34 0 - 46 6 % Final    MCV 04/09/2018 89  79 - 97 fL Final    MCH 04/09/2018 30 1  26 6 - 33 0 pg Final    MCHC 04/09/2018 33 8  31 5 - 35 7 g/dL Final    RDW 04/09/2018 14 7  12 3 - 15 4 % Final    Platelet Count 05/10/2081 293  150 - 379 x10E3/uL Final    Neutrophils 04/09/2018 55  Not Estab  % Final    Lymphocytes 04/09/2018 38  Not Estab  % Final    Monocytes 04/09/2018 5  Not Estab  % Final    Eosinophils 04/09/2018 1  Not Estab  % Final    Basophils 04/09/2018 1  Not Estab  % Final    Neutrophils (Absolute) 04/09/2018 3 2  1 4 - 7 0 x10E3/uL Final    Lymphocytes (Absolute) 04/09/2018 2 2  0 7 - 3 1 x10E3/uL Final    Monocytes (Absolute) 04/09/2018 0 3  0 1 - 0 9 x10E3/uL Final    Eosinophils (Absolute) 04/09/2018 0 1  0 0 - 0 4 x10E3/uL Final    Basophils (Absolute) 04/09/2018 0 0  0 0 - 0 2 x10E3/uL Final    IMM  GRANULOCYTES (CD4/8) 04/09/2018 0  Not Estab  % Final    IIMM  GRANS,ABS (CD4/8) 04/09/2018 0 0  0 0 - 0 1 x10E3/uL Final    SL AMB GLUCOSE 04/09/2018 85  65 - 99 mg/dL Final    BUN 04/09/2018 12  6 - 24 mg/dL Final    Creatinine, Serum 04/09/2018 0 80  0 57 - 1 00 mg/dL Final    eGFR Non African American 04/09/2018 86  >59 mL/min/1 73 Final    SL AMB EGFR  04/09/2018 99  >59 mL/min/1 73 Final    SL AMB BUN/CREATININE RATIO 04/09/2018 15  9 - 23 Final    SL AMB SODIUM 04/09/2018 144  134 - 144 mmol/L Final    SL AMB POTASSIUM 04/09/2018 4 3  3 5 - 5 2 mmol/L Final    SL AMB CHLORIDE 04/09/2018 102  96 - 106 mmol/L Final    SL AMB CARBON DIOXIDE 04/09/2018 29  18 - 29 mmol/L Final    CALCIUM 04/09/2018 9 0  8 7 - 10 2 mg/dL Final    SL AMB PROTEIN, TOTAL 04/09/2018 7 2  6 0 - 8 5 g/dL Final    Serum Albumin 04/09/2018 4 3  3 5 - 5 5 g/dL Final    Globulin, Total 04/09/2018 2 9  1 5 - 4 5 g/dL Final    SL AMB ALBUMIN/GLOBULIN RATIO 04/09/2018 1 5  1 2 - 2 2 Final    SL AMB BILIRUBIN, TOTAL 04/09/2018 0 3  0 0 - 1 2 mg/dL Final    Alk Phos Isoenzymes 04/09/2018 93  39 - 117 IU/L Final    SL AMB AST 04/09/2018 19  0 - 40 IU/L Final    SL AMB ALT 04/09/2018 14  0 - 32 IU/L Final    Cholesterol, Total 04/09/2018 235* 100 - 199 mg/dL Final    Triglycerides 04/09/2018 57  0 - 149 mg/dL Final    SL AMB HDL CHOLESTEROL 04/09/2018 84  >39 mg/dL Final    SL AMB VLDL CHOLESTEROL CALC 04/09/2018 11  5 - 40 mg/dL Final    SL AMB LDL-CHOLESTEROL 04/09/2018 140* 0 - 99 mg/dL Final    T  Chol/HDL Ratio 04/09/2018 2 8  0 0 - 4 4 ratio Final    Comment:                                   T  Chol/HDL Ratio                                              Men  Women                                1/2 Avg  Risk  3 4    3 3                                    Avg Risk  5 0    4 4                                 2X Avg  Risk  9 6    7 1                                 3X Avg  Risk 23 4   11 0      TSH 04/09/2018 1 890  0 450 - 4 500 uIU/mL Final    Vitamin B-12 04/09/2018 793  232 - 1,245 pg/mL Final    25-HYDROXY VIT D 04/09/2018 35 5  30 0 - 100 0 ng/mL Final    Comment: Vitamin D deficiency has been defined by the [facility] [address] practice guideline as a  level of serum 25-OH vitamin D less than 20 ng/mL (1,2)  The Endocrine Society went on to further define vitamin D  insufficiency as a level between 21 and 29 ng/mL (2)  1  IOM ([place] of [employer])  2010  Dietary reference     intakes for calcium and D  [facility]: The     [employer]  2  Janet MF, Trina ADAIR, Janet CARO, et al      Evaluation, treatment, and prevention of vitamin D     deficiency: an Endocrine Society clinical practice     guideline  JCEM  2011 Jul; 96(7):1911-30        Total Iron Binding Capacity (TIBC) 04/09/2018 358  250 - 450 ug/dL Final    UIBC 04/09/2018 115* 131 - 425 ug/dL Final    Iron, Serum 04/09/2018 243* 27 - 159 ug/dL Final    Iron Saturation 04/09/2018 68* 15 - 55 % Final    SL AMB WBC, URINE 04/09/2018 0-5  0 - 5 /hpf Final    SL AMB RBC, URINE 04/09/2018 0-2  0 - 2 /hpf Final    Epithelial Cells (non renal) 04/09/2018 0-10  0 - 10 /hpf Final    Mucus Threads 04/09/2018 Present  Not Estab  Final    Bacteria, Urine 04/09/2018 None seen  None seen/Few Final    SL AMB INTERPRETATION 04/09/2018 Note   Final         Assessment/Plan:         Diagnoses and all orders for this visit:    Abnormal CBC  Comments:  stop iron supp  repeat iron studies in 1 m  Orders:  -     CBC and differential; Future  -     Ferritin; Future  -     Iron; Future  -     TIBC; Future  -     CBC and differential  -     Ferritin  -     Iron  -     TIBC    Hyperlipemia, mixed  Comments:  coq 10 enzyme recommend      Abnormal mammogram  Comments:  will repeat in 6 months     Epigastric pain  Comments:  recommend trying otc omeprazole   bland diet for now     Other orders  -     Cancel: Mammo screening bilateral w cad;  Future            Patient Instructions   Repeat bw in one month  Try omeprazole for stomach pain  Recommend coq 10 enzyme     If abdominal pain worsens--call office                     Halie Mendosa, DO

## 2018-04-19 NOTE — PATIENT INSTRUCTIONS
Repeat bw in one month  Try omeprazole for stomach pain  Recommend coq 10 enzyme     If abdominal pain worsens--call office

## 2018-06-08 LAB
BASOPHILS # BLD AUTO: 0 X10E3/UL (ref 0–0.2)
BASOPHILS NFR BLD AUTO: 0 %
EOSINOPHIL # BLD AUTO: 0.2 X10E3/UL (ref 0–0.4)
EOSINOPHIL NFR BLD AUTO: 2 %
ERYTHROCYTE [DISTWIDTH] IN BLOOD BY AUTOMATED COUNT: 14.2 % (ref 12.3–15.4)
HCT VFR BLD AUTO: 35.9 % (ref 34–46.6)
HGB BLD-MCNC: 11.7 G/DL (ref 11.1–15.9)
IMM GRANULOCYTES # BLD: 0 X10E3/UL (ref 0–0.1)
IMM GRANULOCYTES NFR BLD: 0 %
LYMPHOCYTES # BLD AUTO: 3 X10E3/UL (ref 0.7–3.1)
LYMPHOCYTES NFR BLD AUTO: 39 %
MCH RBC QN AUTO: 29 PG (ref 26.6–33)
MCHC RBC AUTO-ENTMCNC: 32.6 G/DL (ref 31.5–35.7)
MCV RBC AUTO: 89 FL (ref 79–97)
MONOCYTES # BLD AUTO: 0.4 X10E3/UL (ref 0.1–0.9)
MONOCYTES NFR BLD AUTO: 6 %
NEUTROPHILS # BLD AUTO: 4.2 X10E3/UL (ref 1.4–7)
NEUTROPHILS NFR BLD AUTO: 53 %
PLATELET # BLD AUTO: 283 X10E3/UL (ref 150–379)
RBC # BLD AUTO: 4.03 X10E6/UL (ref 3.77–5.28)
WBC # BLD AUTO: 7.9 X10E3/UL (ref 3.4–10.8)

## 2018-06-27 ENCOUNTER — TELEPHONE (OUTPATIENT)
Dept: FAMILY MEDICINE CLINIC | Facility: CLINIC | Age: 53
End: 2018-06-27

## 2018-06-27 NOTE — TELEPHONE ENCOUNTER
Dr Jose Pearl    Patient  Is calling for results of recent labs which were ordered by Dr Davidson Lay

## 2018-06-29 ENCOUNTER — OFFICE VISIT (OUTPATIENT)
Dept: FAMILY MEDICINE CLINIC | Facility: CLINIC | Age: 53
End: 2018-06-29
Payer: COMMERCIAL

## 2018-06-29 VITALS
RESPIRATION RATE: 12 BRPM | HEART RATE: 72 BPM | SYSTOLIC BLOOD PRESSURE: 102 MMHG | TEMPERATURE: 97.1 F | BODY MASS INDEX: 27.44 KG/M2 | WEIGHT: 170 LBS | DIASTOLIC BLOOD PRESSURE: 60 MMHG

## 2018-06-29 DIAGNOSIS — M25.512 ACUTE PAIN OF LEFT SHOULDER: Primary | ICD-10-CM

## 2018-06-29 DIAGNOSIS — M54.12 ACUTE CERVICAL RADICULOPATHY: ICD-10-CM

## 2018-06-29 PROCEDURE — 99214 OFFICE O/P EST MOD 30 MIN: CPT | Performed by: NURSE PRACTITIONER

## 2018-06-29 RX ORDER — PREDNISONE 20 MG/1
20 TABLET ORAL 2 TIMES DAILY WITH MEALS
Qty: 14 TABLET | Refills: 0 | Status: SHIPPED | OUTPATIENT
Start: 2018-06-29 | End: 2018-07-06

## 2018-06-29 NOTE — PROGRESS NOTES
Assessment/Plan:  1  Acute pain of left shoulder  - predniSONE 20 mg tablet; Take 1 tablet (20 mg total) by mouth 2 (two) times a day with meals for 7 days  Dispense: 14 tablet; Refill: 0  - Ambulatory referral to Physical Therapy; Future    2  Acute cervical radiculopathy  - predniSONE 20 mg tablet; Take 1 tablet (20 mg total) by mouth 2 (two) times a day with meals for 7 days  Dispense: 14 tablet; Refill: 0  - Ambulatory referral to Physical Therapy; Future  Will re-evaluate in 2 weeks  If symptoms persist, not improving, or worsening will send to ortho as discussed    Subjective:      Patient ID: Leonila Bucio is a 47 y o  female who presents for left arm pain    Pain left upper arm for one year  Pain starts from neck and radiates to left upper arm  Denies numbness or tingling  No known injury  Hurts when sleeping or in certain positions  Hurts to reach for things such as the seat belt  Tries motrin when bad  The following portions of the patient's history were reviewed and updated as appropriate: allergies, current medications, past family history, past medical history, past social history, past surgical history and problem list     Review of Systems   Musculoskeletal: Positive for arthralgias, myalgias and neck pain  Pain left upper arm, mild left neck pain  Pain shoots from neck down arm  Neurological: Negative for weakness and numbness  Objective:      /60 (BP Location: Right arm, Patient Position: Sitting, Cuff Size: Standard)   Pulse 72   Temp (!) 97 1 °F (36 2 °C) (Temporal)   Resp 12   Wt 77 1 kg (170 lb)   BMI 27 44 kg/m²          Physical Exam   Constitutional: She appears well-developed and well-nourished  No distress  Neck: Normal range of motion  Neck supple  No point cervical vertebral tenderness   Musculoskeletal: She exhibits tenderness  She exhibits no edema or deformity  Decreased overhead motion of left upper extremity  Full abduction and adduction  Motor strength 5/5 b/l upper ext  Lymphadenopathy:     She has no cervical adenopathy  Skin: Skin is warm and dry  No rash noted  No erythema  Psychiatric: She has a normal mood and affect  Her behavior is normal  Judgment and thought content normal    Vitals reviewed

## 2018-07-10 ENCOUNTER — EVALUATION (OUTPATIENT)
Dept: PHYSICAL THERAPY | Facility: CLINIC | Age: 53
End: 2018-07-10
Payer: COMMERCIAL

## 2018-07-10 DIAGNOSIS — M54.12 BRACHIAL NEURITIS: Primary | ICD-10-CM

## 2018-07-10 PROCEDURE — G8981 BODY POS CURRENT STATUS: HCPCS | Performed by: PHYSICAL THERAPIST

## 2018-07-10 PROCEDURE — 97161 PT EVAL LOW COMPLEX 20 MIN: CPT | Performed by: PHYSICAL THERAPIST

## 2018-07-10 PROCEDURE — G8982 BODY POS GOAL STATUS: HCPCS | Performed by: PHYSICAL THERAPIST

## 2018-07-10 NOTE — PROGRESS NOTES
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                   Assessment  Impairments: abnormal or restricted ROM, impaired physical strength, lacks appropriate home exercise program, pain with function, poor posture  and poor body mechanics    Assessment details: Sharyn Dasetpresents with signs and symptoms consistent with Brachial neuritis  (primary encounter diagnosis), with loss of range of motion, strength and spinal stabilization  Presents with medium reactivity  Baldev Moya would benefit with physical therapy to address these impairments to return to prior level of function  Understanding of Dx/Px/POC: good   Prognosis: fair    Goals  STG  Initiate HEP  Able to self correct posture 50% of time in 3 weeks  LTG  Independent with HEP   Able to self-correct posture 75% of time in 6 weeks  Able to drive 2 hours per day without onset of neck or shoulder pain in 6 weeks  Plan  Planned therapy interventions: manual therapy, neuromuscular re-education, postural training, patient education, strengthening, stretching, therapeutic exercise and home exercise program  Frequency: 2x week  Duration in visits: 12  Duration in weeks: 6  Treatment plan discussed with: patient        Subjective Evaluation    History of Present Illness  Mechanism of injury: Left arm pain started one year ago, have had massages but no help  In the last 2 weeks the left arm pain increased and now experiencing neck pain and stiffness  Denies any injury  No past experience    Recurrent probem    Quality of life: good    Pain  Current pain ratin  At best pain ratin  At worst pain ratin  Location: left cervical/ upper arm  Quality: sharp and needle-like  Relieving factors: change in position  Aggravating factors: overhead activity and lifting  Progression: worsening    Social Support  Steps to enter house: yes  Stairs in house: no   L[facility] in: [facility]    Employment status: working  Hand dominance: right    Treatments  Current treatment: physical therapy  Patient Goals  Patient goals for therapy: decreased pain, increased motion, increased strength and independence with ADLs/IADLs          Objective     Postural Observations  Seated posture: poor  Standing posture: poor  Correction of posture: makes symptoms better        Palpation   Left   Tenderness of the latissimus, levator scapulae, sternocleidomastoid and suboccipitals       Active Range of Motion   Cervical/Thoracic Spine   Cervical    Flexion: 90 degrees   Extension: 45 degrees   Left rotation: 80 degrees   Right rotation: 80 degrees   Left Shoulder   Flexion: 160 degrees with pain  Abduction: 90 degrees with pain  External rotation 0°: 65 degrees with pain  Internal rotation 0°: 70 degrees     Right Shoulder   Flexion: 180 degrees   Abduction: 180 degrees   External rotation 0°: 80 degrees   Internal rotation 0°: 70 degrees     Strength/Myotome Testing     Left Shoulder     Planes of Motion   Flexion: 3   Abduction: 3   External rotation at 0°: 3   Internal rotation at 0°: 3     Right Shoulder     Planes of Motion   Flexion: 5   Extension: 5   Abduction: 5   Adduction: 5   External rotation at 0°: 5   Internal rotation at 0°: 5       Flowsheet Rows      Most Recent Value   PT/OT G-Codes   Current Score  53   Projected Score  66   FOTO information reviewed  Yes   Assessment Type  Evaluation   G code set  Changing & Maintaining Body Position   Changing and Maintaining Body Position Current Status ()  CK   Changing and Maintaining Body Position Goal Status ()  CJ      Precautions None    Specialty Daily Treatment Diary     Manual                                                     Exercise Diary  7/10       Axial Extension 5x       Scapular retraction 5x       Chin tuck head press 5x Modalities                                  Posture instruction with emphasis on sternal elevation

## 2018-07-13 ENCOUNTER — APPOINTMENT (OUTPATIENT)
Dept: PHYSICAL THERAPY | Facility: CLINIC | Age: 53
End: 2018-07-13
Payer: COMMERCIAL

## (undated) DEVICE — GAUZE SPONGES,16 PLY: Brand: CURITY

## (undated) DEVICE — AIRLIFE™  ADULT CUSHION NASAL CANNULA WITH 7 FOOT (2.1 M) CRUSH-RESISTANT OXYGEN TUBING, AND U/CONNECT-IT ADAPTER: Brand: AIRLIFE™

## (undated) DEVICE — DISPOSABLE BIOPSY VALVE MAJ-1555: Brand: SINGLE USE BIOPSY VALVE (STERILE)

## (undated) DEVICE — BRUSH ENDO CLEANING DBL-HEADER

## (undated) DEVICE — SNARE BARBED 230CM

## (undated) DEVICE — FORCEP ELECSURG RADIAL JAW4 2.2 X 240CM  HOT BX

## (undated) DEVICE — TUBING BUBBLE CLEAR 5MM X 100 FT NS

## (undated) DEVICE — BRUSH CYTOLOGY 3 MM 240 CM

## (undated) DEVICE — 1200CC GUARDIAN II: Brand: GUARDIAN

## (undated) DEVICE — GROUNDING PAD UNIVERSAL SLW

## (undated) DEVICE — MARKER SPOT EX  BOWEL TATTOO SYRINGE

## (undated) DEVICE — TRAP POLY

## (undated) DEVICE — BAG SPECIMEN BIOHAZARD 10 X 10 ADHESIVE

## (undated) DEVICE — SINGLE-USE BIOPSY FORCEPS: Brand: RADIAL JAW 4

## (undated) DEVICE — Device: Brand: OLYMPUS

## (undated) DEVICE — 60 ML SYRINGE,REGULAR TIP: Brand: MONOJECT

## (undated) DEVICE — TUBING AUX CHANNEL

## (undated) DEVICE — GLOVE EXAM NON-STRL NTRL PLUS LRG PF

## (undated) DEVICE — TRAVELKIT CONTAINS FIRST STEP KIT (200ML EP-4 KIT) AND SOILED SCOPE BAG - 1 KIT: Brand: TRAVELKIT CONTAINS FIRST STEP KIT AND SOILED SCOPE BAG

## (undated) DEVICE — MEDI-VAC YANKAUER SUCTION HANDLE: Brand: CARDINAL HEALTH

## (undated) DEVICE — LUBRICANT SURGILUBE TUBE 4 OZ  FLIP TOP

## (undated) DEVICE — SOLIDIFIER FLUID WASTE CONTROL 1500ML